# Patient Record
Sex: FEMALE | Race: OTHER
[De-identification: names, ages, dates, MRNs, and addresses within clinical notes are randomized per-mention and may not be internally consistent; named-entity substitution may affect disease eponyms.]

---

## 2017-12-04 ENCOUNTER — HOSPITAL ENCOUNTER (INPATIENT)
Dept: HOSPITAL 54 - ER | Age: 47
LOS: 3 days | Discharge: HOME | DRG: 917 | End: 2017-12-07
Attending: NURSE PRACTITIONER | Admitting: NURSE PRACTITIONER
Payer: COMMERCIAL

## 2017-12-04 VITALS — DIASTOLIC BLOOD PRESSURE: 79 MMHG | SYSTOLIC BLOOD PRESSURE: 153 MMHG

## 2017-12-04 VITALS — SYSTOLIC BLOOD PRESSURE: 113 MMHG | DIASTOLIC BLOOD PRESSURE: 76 MMHG

## 2017-12-04 VITALS — SYSTOLIC BLOOD PRESSURE: 135 MMHG | DIASTOLIC BLOOD PRESSURE: 77 MMHG

## 2017-12-04 VITALS — SYSTOLIC BLOOD PRESSURE: 105 MMHG | DIASTOLIC BLOOD PRESSURE: 74 MMHG

## 2017-12-04 VITALS — SYSTOLIC BLOOD PRESSURE: 136 MMHG | DIASTOLIC BLOOD PRESSURE: 79 MMHG

## 2017-12-04 VITALS — DIASTOLIC BLOOD PRESSURE: 87 MMHG | SYSTOLIC BLOOD PRESSURE: 149 MMHG

## 2017-12-04 VITALS — SYSTOLIC BLOOD PRESSURE: 122 MMHG | DIASTOLIC BLOOD PRESSURE: 77 MMHG

## 2017-12-04 VITALS — DIASTOLIC BLOOD PRESSURE: 77 MMHG | SYSTOLIC BLOOD PRESSURE: 135 MMHG

## 2017-12-04 VITALS — DIASTOLIC BLOOD PRESSURE: 87 MMHG | SYSTOLIC BLOOD PRESSURE: 143 MMHG

## 2017-12-04 VITALS — SYSTOLIC BLOOD PRESSURE: 169 MMHG | DIASTOLIC BLOOD PRESSURE: 78 MMHG

## 2017-12-04 VITALS — DIASTOLIC BLOOD PRESSURE: 88 MMHG | SYSTOLIC BLOOD PRESSURE: 171 MMHG

## 2017-12-04 VITALS — DIASTOLIC BLOOD PRESSURE: 70 MMHG | SYSTOLIC BLOOD PRESSURE: 138 MMHG

## 2017-12-04 VITALS — SYSTOLIC BLOOD PRESSURE: 130 MMHG | DIASTOLIC BLOOD PRESSURE: 89 MMHG

## 2017-12-04 VITALS — DIASTOLIC BLOOD PRESSURE: 89 MMHG | SYSTOLIC BLOOD PRESSURE: 172 MMHG

## 2017-12-04 VITALS — DIASTOLIC BLOOD PRESSURE: 86 MMHG | SYSTOLIC BLOOD PRESSURE: 171 MMHG

## 2017-12-04 VITALS — HEIGHT: 63 IN | BODY MASS INDEX: 26.4 KG/M2 | WEIGHT: 149 LBS

## 2017-12-04 VITALS — DIASTOLIC BLOOD PRESSURE: 79 MMHG | SYSTOLIC BLOOD PRESSURE: 170 MMHG

## 2017-12-04 VITALS — SYSTOLIC BLOOD PRESSURE: 180 MMHG | DIASTOLIC BLOOD PRESSURE: 74 MMHG

## 2017-12-04 VITALS — DIASTOLIC BLOOD PRESSURE: 57 MMHG | SYSTOLIC BLOOD PRESSURE: 143 MMHG

## 2017-12-04 VITALS — DIASTOLIC BLOOD PRESSURE: 76 MMHG | SYSTOLIC BLOOD PRESSURE: 107 MMHG

## 2017-12-04 VITALS — DIASTOLIC BLOOD PRESSURE: 91 MMHG | SYSTOLIC BLOOD PRESSURE: 142 MMHG

## 2017-12-04 VITALS — SYSTOLIC BLOOD PRESSURE: 164 MMHG | DIASTOLIC BLOOD PRESSURE: 83 MMHG

## 2017-12-04 VITALS — SYSTOLIC BLOOD PRESSURE: 162 MMHG | DIASTOLIC BLOOD PRESSURE: 83 MMHG

## 2017-12-04 VITALS — SYSTOLIC BLOOD PRESSURE: 167 MMHG | DIASTOLIC BLOOD PRESSURE: 87 MMHG

## 2017-12-04 VITALS — DIASTOLIC BLOOD PRESSURE: 86 MMHG | SYSTOLIC BLOOD PRESSURE: 140 MMHG

## 2017-12-04 VITALS — SYSTOLIC BLOOD PRESSURE: 199 MMHG | DIASTOLIC BLOOD PRESSURE: 100 MMHG

## 2017-12-04 DIAGNOSIS — E87.6: ICD-10-CM

## 2017-12-04 DIAGNOSIS — J96.02: ICD-10-CM
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
## 2017-12-04 DIAGNOSIS — D64.9: ICD-10-CM

## 2017-12-04 DIAGNOSIS — G92: ICD-10-CM

## 2017-12-04 DIAGNOSIS — T43.621A: Primary | ICD-10-CM

## 2017-12-04 DIAGNOSIS — G40.909: ICD-10-CM

## 2017-12-04 DIAGNOSIS — E87.1: ICD-10-CM

## 2017-12-04 DIAGNOSIS — J96.01: ICD-10-CM

## 2017-12-04 LAB
ALBUMIN SERPL BCP-MCNC: 3.3 G/DL (ref 3.4–5)
ALP SERPL-CCNC: 59 U/L (ref 46–116)
ALT SERPL W P-5'-P-CCNC: 15 U/L (ref 12–78)
APTT PPP: 22 SEC (ref 23–34)
AST SERPL W P-5'-P-CCNC: 16 U/L (ref 15–37)
BASE EXCESS BLDA CALC-SCNC: -3.1 MMOL/L
BASE EXCESS BLDA CALC-SCNC: -6.8 MMOL/L
BASOPHILS # BLD AUTO: 0.1 /CMM (ref 0–0.2)
BASOPHILS NFR BLD AUTO: 0.5 % (ref 0–2)
BILIRUB DIRECT SERPL-MCNC: 0 MG/DL (ref 0–0.2)
BILIRUB SERPL-MCNC: 0.1 MG/DL (ref 0.2–1)
BUN SERPL-MCNC: 15 MG/DL (ref 7–18)
CALCIUM SERPL-MCNC: 8.1 MG/DL (ref 8.5–10.1)
CHLORIDE SERPL-SCNC: 100 MMOL/L (ref 98–107)
CO2 SERPL-SCNC: 25 MMOL/L (ref 21–32)
CREAT SERPL-MCNC: 0.8 MG/DL (ref 0.6–1.3)
DO-HGB MFR BLDA: 0 MMHG
DO-HGB MFR BLDA: 69.3 MMHG
EOSINOPHIL # BLD AUTO: 0.2 /CMM (ref 0–0.7)
EOSINOPHIL NFR BLD AUTO: 2.6 % (ref 0–6)
GLUCOSE SERPL-MCNC: 122 MG/DL (ref 74–106)
HCT VFR BLD AUTO: 32 % (ref 33–45)
HGB BLD-MCNC: 10 G/DL (ref 11.5–14.8)
INHALED O2 CONCENTRATION: 40 %
INHALED O2 CONCENTRATION: 50 %
INHALED O2 FLOW RATE: 5 L/MIN (ref 0–30)
INR PPP: 1.02 (ref 0.87–1.13)
LYMPHOCYTES NFR BLD AUTO: 2.3 /CMM (ref 0.8–4.8)
LYMPHOCYTES NFR BLD AUTO: 24.3 % (ref 20–44)
MCH RBC QN AUTO: 26 PG (ref 26–33)
MCHC RBC AUTO-ENTMCNC: 31 G/DL (ref 31–36)
MCV RBC AUTO: 84 FL (ref 82–100)
MONOCYTES NFR BLD AUTO: 0.6 /CMM (ref 0.1–1.3)
MONOCYTES NFR BLD AUTO: 6.8 % (ref 2–12)
NEUTROPHILS # BLD AUTO: 6.2 /CMM (ref 1.8–8.9)
NEUTROPHILS NFR BLD AUTO: 65.8 % (ref 43–81)
PCO2 TEMP ADJ BLDA: 106.3 MMHG (ref 35–45)
PCO2 TEMP ADJ BLDA: 31.7 MMHG (ref 35–45)
PH TEMP ADJ BLDA: 7.01 [PH] (ref 7.35–7.45)
PH TEMP ADJ BLDA: 7.43 [PH] (ref 7.35–7.45)
PLATELET # BLD AUTO: 349 /CMM (ref 150–450)
PO2 TEMP ADJ BLDA: 246.8 MMHG (ref 75–100)
PO2 TEMP ADJ BLDA: 251.5 MMHG (ref 75–100)
POTASSIUM SERPL-SCNC: 3.7 MMOL/L (ref 3.5–5.1)
PROT SERPL-MCNC: 6.4 G/DL (ref 6.4–8.2)
PROTHROMBIN TIME: 10.6 SECS (ref 9.5–12.7)
RBC # BLD AUTO: 3.78 MIL/UL (ref 4–5.2)
RDW COEFFICIENT OF VARIATION: 14.4 (ref 11.5–15)
SAO2 % BLDA: 98.7 % (ref 92–98.5)
SAO2 % BLDA: 99.1 % (ref 92–98.5)
SODIUM SERPL-SCNC: 134 MMOL/L (ref 136–145)
TROPONIN I SERPL-MCNC: < 0.017 NG/ML (ref 0–0.06)
WBC NRBC COR # BLD AUTO: 9.4 K/UL (ref 4.3–11)

## 2017-12-04 PROCEDURE — 0BH17EZ INSERTION OF ENDOTRACHEAL AIRWAY INTO TRACHEA, VIA NATURAL OR ARTIFICIAL OPENING: ICD-10-PCS | Performed by: INTERNAL MEDICINE

## 2017-12-04 PROCEDURE — Z7610: HCPCS

## 2017-12-04 PROCEDURE — A4606 OXYGEN PROBE USED W OXIMETER: HCPCS

## 2017-12-04 PROCEDURE — 5A1935Z RESPIRATORY VENTILATION, LESS THAN 24 CONSECUTIVE HOURS: ICD-10-PCS | Performed by: INTERNAL MEDICINE

## 2017-12-04 RX ADMIN — PROPOFOL PRN MLS/HR: 10 INJECTION, EMULSION INTRAVENOUS at 20:36

## 2017-12-04 RX ADMIN — FAMOTIDINE SCH MG: 10 INJECTION INTRAVENOUS at 21:22

## 2017-12-04 RX ADMIN — PROPOFOL PRN MLS/HR: 10 INJECTION, EMULSION INTRAVENOUS at 22:12

## 2017-12-04 RX ADMIN — SODIUM CHLORIDE PRN MLS/HR: 9 INJECTION, SOLUTION INTRAVENOUS at 19:53

## 2017-12-04 NOTE — NUR
ICU/RN 

FAMILY AT BEDSIDE.PT HAS NO HX OF ANY DISEASE.HEALTHY ,GOING TO THE  GYM DAILY.SHE WAS FOUND 
IN [place] IN THE GYM HAD SEIZURES. CT OF THE HEAD NEGATIVE.PT IS FULL CODE.PT NEED TO BE 
INTUBATED.ER DOCTOR NOTIFIED.ABG ORDERED.CONTINUE MONITORING.

## 2017-12-04 NOTE — NUR
RN:ICU: PT RECEIVED IN BED, RECENTLY ADMITTED FROM ER FOR ACUTE ENCEPHALOPATHY SECONDARY TO 
NEW ONSET SZ. PT BARELY RESPONSIVE TO DEEP PAINFUL STIMULI. PT NOTED WITH SHALLOW BREATHING, 
GURGLING AND WITH HER TONGUE PARTIALLY OCCLUDING HER AIRWAY. SPOKE WITH NORBERTO NP REGARDING PT 
CONDITION AND CONCERNS WITH PATIENT BEING ABLE TO PROTECT AIRWAY. NP ORDERED TO HAVE ER MD 
TO INTUBATE PT AND PRESCRIBED PRN HYDRALAZINE FOR ELEVATED SBP. ER MD CALLED TO THE BEDSIDE 
TO ASSESS PT AND INTUBATE. BASED OF THE ER MD'S ASSESSMENT SHE FELT LIKE THE PATIENT WAS 
WAKING UP AND DID NOT NEED TO BE INTUBATED. STAT ABG ORDERED TO DETERMINE IF THE PATIENT WAS 
VENTILATING ADEQUATELY. PER ER MD PERFORM ABG AND CALL HER WITH THE RESULTS IF THERE WERE 
ANY ABNORMALITIES. RT AT BEDSIDE PERFORMING ABG. WILL CONTINUE TO MONITOR CLOSELY. PT FAMILY 
UPDATED REGARDING POC.

## 2017-12-04 NOTE — NUR
ICU/RN

PT ADMITTED FROM ER .UNRESPONSIVE .ON 6L SIMPLE MASK ,SAT O2-99%.PT HAS A LOT OF ORAL  
SECRETION. SUCTION PROVIDED.IV -HL.UNABLE PROVIDE ANY HISTORY.SKIN INTACT.

## 2017-12-04 NOTE — NUR
RN:ICU: ABG 1 HOUR POST INTUBATED REPORTED TO MD ON CALL. NEW ORDERS TO MAKE VENT CHANGES. 
ORDERS RECEIVED TO INCREASE DIPRIVAN TO GREATER THAN 50MCG/KG/MIN AS PT CONTINUES CONTINUES 
TO COUGH AND IS AT HIGH RISK FOR SELF EXTUBATION AS SHE COUGHS VIOLENTLY AND IS BITING TUBE. 
ALSO BSWR ORDERED FOR PT SAFETY AS SHE MAY SELF EXTUBATE, DESPITE REORIENTATION. FAMILY AT 
THE BEDSIDE AND AWARE OF POC. WILL CONTINUE TO MONITOR CLOSELY.

## 2017-12-04 NOTE — NUR
[id number], [id number] FROM [facility] FOR S/P SEIZURE WHILE IN [facility], BS-177, PT IS 
UNRESPONSIVE. RR IS EVEN AND UNLABORED WITH NAD NOTED. SKIN IS WARM AND NON 
DIAPHORETIC. NASAL TRUMPET NOTED PTA. DR MARTINEZ AT BS FOR EVAL. PATIENT 
PLACED ON THE MONITOR.

## 2017-12-04 NOTE — NUR
@1947 PT ORALLY INTUBATED BY DR. ARIAS. 7.5 ETT SECURED AT 22CM AT THE LIP. GOOD COLOR 
CHANGE ON CO2 DETECTOR. BREATH SOUND GOOD, EQUAL CHEST RISE. PT PLACED  VENT. PLACED 
ANCHOR FAST. ETT TUBE IS ON RIGHT SIDE OF THE LIP. SX'D FOR WHITE SECRETIONS. VENT ALARMS 
SET AND AUDIBLE. AMBU BAG AT BEDSIDE. VENT PLUGGED INTO RED OUTLET. WILL CONTINUE TO 
MONITOR.

## 2017-12-05 VITALS — DIASTOLIC BLOOD PRESSURE: 68 MMHG | SYSTOLIC BLOOD PRESSURE: 94 MMHG

## 2017-12-05 VITALS — SYSTOLIC BLOOD PRESSURE: 122 MMHG | DIASTOLIC BLOOD PRESSURE: 67 MMHG

## 2017-12-05 VITALS — SYSTOLIC BLOOD PRESSURE: 104 MMHG | DIASTOLIC BLOOD PRESSURE: 82 MMHG

## 2017-12-05 VITALS — SYSTOLIC BLOOD PRESSURE: 157 MMHG | DIASTOLIC BLOOD PRESSURE: 84 MMHG

## 2017-12-05 VITALS — DIASTOLIC BLOOD PRESSURE: 88 MMHG | SYSTOLIC BLOOD PRESSURE: 151 MMHG

## 2017-12-05 VITALS — SYSTOLIC BLOOD PRESSURE: 146 MMHG | DIASTOLIC BLOOD PRESSURE: 86 MMHG

## 2017-12-05 VITALS — SYSTOLIC BLOOD PRESSURE: 150 MMHG | DIASTOLIC BLOOD PRESSURE: 87 MMHG

## 2017-12-05 VITALS — SYSTOLIC BLOOD PRESSURE: 147 MMHG | DIASTOLIC BLOOD PRESSURE: 80 MMHG

## 2017-12-05 VITALS — DIASTOLIC BLOOD PRESSURE: 73 MMHG | SYSTOLIC BLOOD PRESSURE: 129 MMHG

## 2017-12-05 VITALS — SYSTOLIC BLOOD PRESSURE: 107 MMHG | DIASTOLIC BLOOD PRESSURE: 64 MMHG

## 2017-12-05 VITALS — DIASTOLIC BLOOD PRESSURE: 64 MMHG | SYSTOLIC BLOOD PRESSURE: 120 MMHG

## 2017-12-05 VITALS — DIASTOLIC BLOOD PRESSURE: 73 MMHG | SYSTOLIC BLOOD PRESSURE: 122 MMHG

## 2017-12-05 VITALS — DIASTOLIC BLOOD PRESSURE: 66 MMHG | SYSTOLIC BLOOD PRESSURE: 112 MMHG

## 2017-12-05 VITALS — SYSTOLIC BLOOD PRESSURE: 111 MMHG | DIASTOLIC BLOOD PRESSURE: 68 MMHG

## 2017-12-05 VITALS — DIASTOLIC BLOOD PRESSURE: 95 MMHG | SYSTOLIC BLOOD PRESSURE: 172 MMHG

## 2017-12-05 VITALS — SYSTOLIC BLOOD PRESSURE: 150 MMHG | DIASTOLIC BLOOD PRESSURE: 91 MMHG

## 2017-12-05 VITALS — DIASTOLIC BLOOD PRESSURE: 69 MMHG | SYSTOLIC BLOOD PRESSURE: 122 MMHG

## 2017-12-05 VITALS — SYSTOLIC BLOOD PRESSURE: 125 MMHG | DIASTOLIC BLOOD PRESSURE: 61 MMHG

## 2017-12-05 VITALS — SYSTOLIC BLOOD PRESSURE: 157 MMHG | DIASTOLIC BLOOD PRESSURE: 87 MMHG

## 2017-12-05 VITALS — SYSTOLIC BLOOD PRESSURE: 157 MMHG | DIASTOLIC BLOOD PRESSURE: 78 MMHG

## 2017-12-05 VITALS — SYSTOLIC BLOOD PRESSURE: 110 MMHG | DIASTOLIC BLOOD PRESSURE: 63 MMHG

## 2017-12-05 VITALS — DIASTOLIC BLOOD PRESSURE: 67 MMHG | SYSTOLIC BLOOD PRESSURE: 125 MMHG

## 2017-12-05 VITALS — SYSTOLIC BLOOD PRESSURE: 153 MMHG | DIASTOLIC BLOOD PRESSURE: 58 MMHG

## 2017-12-05 VITALS — DIASTOLIC BLOOD PRESSURE: 67 MMHG | SYSTOLIC BLOOD PRESSURE: 111 MMHG

## 2017-12-05 VITALS — DIASTOLIC BLOOD PRESSURE: 69 MMHG | SYSTOLIC BLOOD PRESSURE: 118 MMHG

## 2017-12-05 VITALS — SYSTOLIC BLOOD PRESSURE: 151 MMHG | DIASTOLIC BLOOD PRESSURE: 90 MMHG

## 2017-12-05 VITALS — SYSTOLIC BLOOD PRESSURE: 115 MMHG | DIASTOLIC BLOOD PRESSURE: 69 MMHG

## 2017-12-05 VITALS — SYSTOLIC BLOOD PRESSURE: 140 MMHG | DIASTOLIC BLOOD PRESSURE: 84 MMHG

## 2017-12-05 VITALS — DIASTOLIC BLOOD PRESSURE: 89 MMHG | SYSTOLIC BLOOD PRESSURE: 121 MMHG

## 2017-12-05 VITALS — DIASTOLIC BLOOD PRESSURE: 45 MMHG | SYSTOLIC BLOOD PRESSURE: 116 MMHG

## 2017-12-05 VITALS — DIASTOLIC BLOOD PRESSURE: 55 MMHG | SYSTOLIC BLOOD PRESSURE: 113 MMHG

## 2017-12-05 VITALS — SYSTOLIC BLOOD PRESSURE: 116 MMHG | DIASTOLIC BLOOD PRESSURE: 45 MMHG

## 2017-12-05 VITALS — SYSTOLIC BLOOD PRESSURE: 160 MMHG | DIASTOLIC BLOOD PRESSURE: 58 MMHG

## 2017-12-05 VITALS — SYSTOLIC BLOOD PRESSURE: 153 MMHG | DIASTOLIC BLOOD PRESSURE: 87 MMHG

## 2017-12-05 VITALS — SYSTOLIC BLOOD PRESSURE: 111 MMHG | DIASTOLIC BLOOD PRESSURE: 61 MMHG

## 2017-12-05 VITALS — DIASTOLIC BLOOD PRESSURE: 72 MMHG | SYSTOLIC BLOOD PRESSURE: 144 MMHG

## 2017-12-05 VITALS — DIASTOLIC BLOOD PRESSURE: 64 MMHG | SYSTOLIC BLOOD PRESSURE: 116 MMHG

## 2017-12-05 VITALS — SYSTOLIC BLOOD PRESSURE: 119 MMHG | DIASTOLIC BLOOD PRESSURE: 71 MMHG

## 2017-12-05 VITALS — SYSTOLIC BLOOD PRESSURE: 154 MMHG | DIASTOLIC BLOOD PRESSURE: 89 MMHG

## 2017-12-05 VITALS — SYSTOLIC BLOOD PRESSURE: 155 MMHG | DIASTOLIC BLOOD PRESSURE: 89 MMHG

## 2017-12-05 VITALS — SYSTOLIC BLOOD PRESSURE: 145 MMHG | DIASTOLIC BLOOD PRESSURE: 85 MMHG

## 2017-12-05 VITALS — SYSTOLIC BLOOD PRESSURE: 120 MMHG | DIASTOLIC BLOOD PRESSURE: 65 MMHG

## 2017-12-05 VITALS — SYSTOLIC BLOOD PRESSURE: 167 MMHG | DIASTOLIC BLOOD PRESSURE: 88 MMHG

## 2017-12-05 LAB
APPEARANCE UR: CLEAR
BASE EXCESS BLDA CALC-SCNC: -2.7 MMOL/L
BASE EXCESS BLDA CALC-SCNC: -3 MMOL/L
BASOPHILS # BLD AUTO: 0 /CMM (ref 0–0.2)
BASOPHILS NFR BLD AUTO: 0.2 % (ref 0–2)
BILIRUB UR QL STRIP: NEGATIVE
BUN SERPL-MCNC: 8 MG/DL (ref 7–18)
CALCIUM SERPL-MCNC: 8 MG/DL (ref 8.5–10.1)
CHLORIDE SERPL-SCNC: 106 MMOL/L (ref 98–107)
CO2 SERPL-SCNC: 23 MMOL/L (ref 21–32)
COLOR UR: YELLOW
CREAT SERPL-MCNC: 0.5 MG/DL (ref 0.6–1.3)
DO-HGB MFR BLDA: 29.1 MMHG
DO-HGB MFR BLDA: 64.9 MMHG
EOSINOPHIL # BLD AUTO: 0 /CMM (ref 0–0.7)
EOSINOPHIL NFR BLD AUTO: 0.3 % (ref 0–6)
GLUCOSE SERPL-MCNC: 85 MG/DL (ref 74–106)
GLUCOSE UR STRIP-MCNC: NEGATIVE MG/DL
HCT VFR BLD AUTO: 31 % (ref 33–45)
HGB BLD-MCNC: 10 G/DL (ref 11.5–14.8)
HGB UR QL STRIP: (no result) ERY/UL
INHALED O2 CONCENTRATION: 36 %
INHALED O2 CONCENTRATION: 40 %
INHALED O2 FLOW RATE: 4 L/MIN (ref 0–30)
INTRINSIC PEEP RESPIRATORY: 5 CM H2O
KETONES UR STRIP-MCNC: (no result) MG/DL
LEUKOCYTE ESTERASE UR QL STRIP: NEGATIVE
LYMPHOCYTES NFR BLD AUTO: 1.3 /CMM (ref 0.8–4.8)
LYMPHOCYTES NFR BLD AUTO: 13.2 % (ref 20–44)
MAGNESIUM SERPL-MCNC: 1.9 MG/DL (ref 1.8–2.4)
MCH RBC QN AUTO: 27 PG (ref 26–33)
MCHC RBC AUTO-ENTMCNC: 32 G/DL (ref 31–36)
MCV RBC AUTO: 83 FL (ref 82–100)
MONOCYTES NFR BLD AUTO: 0.9 /CMM (ref 0.1–1.3)
MONOCYTES NFR BLD AUTO: 8.7 % (ref 2–12)
NEUTROPHILS # BLD AUTO: 7.8 /CMM (ref 1.8–8.9)
NEUTROPHILS NFR BLD AUTO: 77.6 % (ref 43–81)
NITRITE UR QL STRIP: NEGATIVE
PCO2 TEMP ADJ BLDA: 40.9 MMHG (ref 35–45)
PCO2 TEMP ADJ BLDA: 43.6 MMHG (ref 35–45)
PH TEMP ADJ BLDA: 7.34 [PH] (ref 7.35–7.45)
PH TEMP ADJ BLDA: 7.35 [PH] (ref 7.35–7.45)
PH UR STRIP: 6.5 [PH] (ref 5–8)
PHOSPHATE SERPL-MCNC: 3.6 MG/DL (ref 2.5–4.9)
PLATELET # BLD AUTO: 305 /CMM (ref 150–450)
PO2 TEMP ADJ BLDA: 173.3 MMHG (ref 75–100)
PO2 TEMP ADJ BLDA: 177 MMHG (ref 75–100)
POTASSIUM SERPL-SCNC: 3.3 MMOL/L (ref 3.5–5.1)
PROT UR QL STRIP: (no result) MG/DL
RBC # BLD AUTO: 3.77 MIL/UL (ref 4–5.2)
RBC #/AREA URNS HPF: (no result) /HPF (ref 0–2)
RDW COEFFICIENT OF VARIATION: 14.4 (ref 11.5–15)
SAO2 % BLDA: 98.4 % (ref 92–98.5)
SAO2 % BLDA: 98.6 % (ref 92–98.5)
SET RATE, BG: 4
SODIUM SERPL-SCNC: 139 MMOL/L (ref 136–145)
TSH SERPL DL<=0.005 MIU/L-ACNC: 0.54 UIU/ML (ref 0.36–3.74)
UROBILINOGEN UR STRIP-MCNC: 0.2 EU/DL
VENTILATION MODE VENT: (no result)
VENTILATION MODE VENT: (no result)
WBC #/AREA URNS HPF: (no result) /HPF (ref 0–3)
WBC NRBC COR # BLD AUTO: 10.1 K/UL (ref 4.3–11)

## 2017-12-05 RX ADMIN — PROPOFOL PRN MLS/HR: 10 INJECTION, EMULSION INTRAVENOUS at 08:23

## 2017-12-05 RX ADMIN — ACETAMINOPHEN PRN MG: 325 TABLET ORAL at 20:27

## 2017-12-05 RX ADMIN — ENOXAPARIN SODIUM SCH MG: 40 INJECTION SUBCUTANEOUS at 08:42

## 2017-12-05 RX ADMIN — POTASSIUM CHLORIDE SCH MLS/HR: 200 INJECTION, SOLUTION INTRAVENOUS at 10:31

## 2017-12-05 RX ADMIN — SODIUM CHLORIDE SCH MLS/HR: 9 INJECTION, SOLUTION INTRAVENOUS at 20:27

## 2017-12-05 RX ADMIN — PROPOFOL PRN MLS/HR: 10 INJECTION, EMULSION INTRAVENOUS at 00:55

## 2017-12-05 RX ADMIN — PROPOFOL PRN MLS/HR: 10 INJECTION, EMULSION INTRAVENOUS at 03:56

## 2017-12-05 RX ADMIN — ACETAMINOPHEN, ASPIRIN (NSAID), AND CAFFEINE PRN EACH: 250; 250; 65 TABLET, FILM COATED ORAL at 17:26

## 2017-12-05 RX ADMIN — FAMOTIDINE SCH MG: 10 INJECTION INTRAVENOUS at 08:41

## 2017-12-05 RX ADMIN — SODIUM CHLORIDE PRN MLS/HR: 9 INJECTION, SOLUTION INTRAVENOUS at 17:17

## 2017-12-05 RX ADMIN — SODIUM CHLORIDE PRN MLS/HR: 9 INJECTION, SOLUTION INTRAVENOUS at 03:57

## 2017-12-05 RX ADMIN — FAMOTIDINE SCH MG: 10 INJECTION INTRAVENOUS at 20:27

## 2017-12-05 RX ADMIN — POTASSIUM CHLORIDE SCH MLS/HR: 200 INJECTION, SOLUTION INTRAVENOUS at 11:31

## 2017-12-05 RX ADMIN — ACETAMINOPHEN PRN MG: 325 TABLET ORAL at 12:40

## 2017-12-05 RX ADMIN — SODIUM CHLORIDE SCH MLS/HR: 9 INJECTION, SOLUTION INTRAVENOUS at 08:45

## 2017-12-05 RX ADMIN — PROPOFOL PRN MLS/HR: 10 INJECTION, EMULSION INTRAVENOUS at 05:42

## 2017-12-05 NOTE — NUR
Patient lives at home with family. Prior to admission, she was physically active, works at 
[employer]. Patient was extubated, tolerating O2 nasal . Plan to dc back home, has good 
family support. 










-------------------------------------------------------------------------------

Addendum: 12/05/17 at 1629 by GAYLE FRANCO RN

-------------------------------------------------------------------------------

Amended: Links added.

## 2017-12-05 NOTE — NUR
PT C/O OF MIGRAINE HEADACHE, GAVE TYLENOL EARLIER WITH NO RELIEF. NOTIFIED DR. TRIANA WHO 
OKAYS TO ORDER EXCEDRIN 1 TAB Q6HR PRN MIGRAINE.

## 2017-12-05 NOTE — NUR
DR. TRIANA NOTIFIED ABOUT POTASSIUM REPLACEMENT HE SAYS ITS OKAY TO REPLACE THROUGH NGTUBE 
INSTEAD OF IV BECAUSE WE ONLY HAVE PERIPH IV'S.

## 2017-12-05 NOTE — NUR
RN:ICU: PT CONTINUES TO BECOME EASILY AGITATED, COUGHING VIOLENTLY AND IS AT RISK FOR SELF 
EXTUBATION, DESPITE BEING ON 90MCG/KG/MIN. SEDATION INCREASED FOR PT COMFORT AND SAFETY, 
WITH BSWR IN PLACE. PT HAS HIGH TOLERANCE TO SEDATION MEDICATION. IV SITE WHERE DIPRIVAN IS 
INFUSING REMAINS PATENT. PT URINE TOXICOLOGY SCREEN POSITIVE FOR AMPHETAMINES, WILL ENDORSE 
TO ONCOMING SHIFT.

## 2017-12-05 NOTE — NUR
TITRATING DOWN DIPRIVAN (FROM 100MCG) FOR SEDATION VACATION. PT IS ALREADY RESTLESS MOVING 
ARM AND LEGS, BUT DOESN'T FOLLOW SIMPLE COMMANDS YET TO OPEN EYES OR SQUEEZE HANDS. WILL 
CONTINUE TO TITRATE DOWN AND ASSESS. RESTRAINTS IN PLACE. PT'S BROTHER IS AT BEDSIDE, I 
ORIENTED HIM TO THE PLAN OF CARE AND TO REMAIN CALM AND TO ORIENT HER ABOUT THE SITUATION AS 
SHE CONTINUES TO WAKE UP.

## 2017-12-05 NOTE — NUR
RN:ICU: PT RECEIVED IN BED CONFUSED BUT ABLE TO FOLLOW COMMANDS. PT CONTINUES TO ASK HOW SHE 
GOT TO THE HOSPITAL. PT REORIENTED SEVERAL TIMES BUT PT REMAINS CONFUSED. PT DENIES PAIN OR 
RESPIRATORY DISTRESS. ASPIRATION AND FALL PRECAUTIONS IN PLACE. WILL CONTINUE TO MONITOR 
CLOSELY.

## 2017-12-06 VITALS — SYSTOLIC BLOOD PRESSURE: 125 MMHG | DIASTOLIC BLOOD PRESSURE: 63 MMHG

## 2017-12-06 VITALS — DIASTOLIC BLOOD PRESSURE: 71 MMHG | SYSTOLIC BLOOD PRESSURE: 141 MMHG

## 2017-12-06 VITALS — DIASTOLIC BLOOD PRESSURE: 73 MMHG | SYSTOLIC BLOOD PRESSURE: 136 MMHG

## 2017-12-06 VITALS — SYSTOLIC BLOOD PRESSURE: 143 MMHG | DIASTOLIC BLOOD PRESSURE: 81 MMHG

## 2017-12-06 VITALS — DIASTOLIC BLOOD PRESSURE: 77 MMHG | SYSTOLIC BLOOD PRESSURE: 126 MMHG

## 2017-12-06 VITALS — DIASTOLIC BLOOD PRESSURE: 78 MMHG | SYSTOLIC BLOOD PRESSURE: 144 MMHG

## 2017-12-06 VITALS — SYSTOLIC BLOOD PRESSURE: 118 MMHG | DIASTOLIC BLOOD PRESSURE: 69 MMHG

## 2017-12-06 VITALS — DIASTOLIC BLOOD PRESSURE: 64 MMHG | SYSTOLIC BLOOD PRESSURE: 116 MMHG

## 2017-12-06 VITALS — DIASTOLIC BLOOD PRESSURE: 76 MMHG | SYSTOLIC BLOOD PRESSURE: 130 MMHG

## 2017-12-06 VITALS — DIASTOLIC BLOOD PRESSURE: 90 MMHG | SYSTOLIC BLOOD PRESSURE: 159 MMHG

## 2017-12-06 VITALS — SYSTOLIC BLOOD PRESSURE: 150 MMHG | DIASTOLIC BLOOD PRESSURE: 82 MMHG

## 2017-12-06 VITALS — DIASTOLIC BLOOD PRESSURE: 83 MMHG | SYSTOLIC BLOOD PRESSURE: 154 MMHG

## 2017-12-06 VITALS — SYSTOLIC BLOOD PRESSURE: 152 MMHG | DIASTOLIC BLOOD PRESSURE: 92 MMHG

## 2017-12-06 VITALS — DIASTOLIC BLOOD PRESSURE: 74 MMHG | SYSTOLIC BLOOD PRESSURE: 137 MMHG

## 2017-12-06 VITALS — DIASTOLIC BLOOD PRESSURE: 67 MMHG | SYSTOLIC BLOOD PRESSURE: 117 MMHG

## 2017-12-06 VITALS — SYSTOLIC BLOOD PRESSURE: 150 MMHG | DIASTOLIC BLOOD PRESSURE: 81 MMHG

## 2017-12-06 VITALS — SYSTOLIC BLOOD PRESSURE: 121 MMHG | DIASTOLIC BLOOD PRESSURE: 63 MMHG

## 2017-12-06 VITALS — DIASTOLIC BLOOD PRESSURE: 66 MMHG | SYSTOLIC BLOOD PRESSURE: 125 MMHG

## 2017-12-06 VITALS — SYSTOLIC BLOOD PRESSURE: 156 MMHG | DIASTOLIC BLOOD PRESSURE: 91 MMHG

## 2017-12-06 VITALS — SYSTOLIC BLOOD PRESSURE: 131 MMHG | DIASTOLIC BLOOD PRESSURE: 82 MMHG

## 2017-12-06 VITALS — SYSTOLIC BLOOD PRESSURE: 147 MMHG | DIASTOLIC BLOOD PRESSURE: 75 MMHG

## 2017-12-06 VITALS — SYSTOLIC BLOOD PRESSURE: 143 MMHG | DIASTOLIC BLOOD PRESSURE: 94 MMHG

## 2017-12-06 LAB
BASOPHILS # BLD AUTO: 0 /CMM (ref 0–0.2)
BASOPHILS NFR BLD AUTO: 0.4 % (ref 0–2)
BUN SERPL-MCNC: 4 MG/DL (ref 7–18)
CALCIUM SERPL-MCNC: 8.4 MG/DL (ref 8.5–10.1)
CHLORIDE SERPL-SCNC: 105 MMOL/L (ref 98–107)
CO2 SERPL-SCNC: 26 MMOL/L (ref 21–32)
CREAT SERPL-MCNC: 0.5 MG/DL (ref 0.6–1.3)
EOSINOPHIL # BLD AUTO: 0 /CMM (ref 0–0.7)
EOSINOPHIL NFR BLD AUTO: 0.5 % (ref 0–6)
GLUCOSE SERPL-MCNC: 87 MG/DL (ref 74–106)
HCT VFR BLD AUTO: 30 % (ref 33–45)
HGB BLD-MCNC: 9.8 G/DL (ref 11.5–14.8)
LYMPHOCYTES NFR BLD AUTO: 1.2 /CMM (ref 0.8–4.8)
LYMPHOCYTES NFR BLD AUTO: 14 % (ref 20–44)
MCH RBC QN AUTO: 27 PG (ref 26–33)
MCHC RBC AUTO-ENTMCNC: 33 G/DL (ref 31–36)
MCV RBC AUTO: 83 FL (ref 82–100)
MONOCYTES NFR BLD AUTO: 0.7 /CMM (ref 0.1–1.3)
MONOCYTES NFR BLD AUTO: 7.6 % (ref 2–12)
NEUTROPHILS # BLD AUTO: 6.8 /CMM (ref 1.8–8.9)
NEUTROPHILS NFR BLD AUTO: 77.5 % (ref 43–81)
PLATELET # BLD AUTO: 289 /CMM (ref 150–450)
POTASSIUM SERPL-SCNC: 3.1 MMOL/L (ref 3.5–5.1)
RBC # BLD AUTO: 3.61 MIL/UL (ref 4–5.2)
RDW COEFFICIENT OF VARIATION: 14.6 (ref 11.5–15)
SODIUM SERPL-SCNC: 139 MMOL/L (ref 136–145)
WBC NRBC COR # BLD AUTO: 8.8 K/UL (ref 4.3–11)

## 2017-12-06 RX ADMIN — SODIUM CHLORIDE SCH MLS/HR: 9 INJECTION, SOLUTION INTRAVENOUS at 08:39

## 2017-12-06 RX ADMIN — SODIUM CHLORIDE PRN MLS/HR: 9 INJECTION, SOLUTION INTRAVENOUS at 19:56

## 2017-12-06 RX ADMIN — FAMOTIDINE SCH MG: 10 INJECTION INTRAVENOUS at 08:33

## 2017-12-06 RX ADMIN — SODIUM CHLORIDE PRN MLS/HR: 9 INJECTION, SOLUTION INTRAVENOUS at 09:52

## 2017-12-06 RX ADMIN — FAMOTIDINE SCH MG: 10 INJECTION INTRAVENOUS at 21:57

## 2017-12-06 RX ADMIN — SODIUM CHLORIDE PRN MLS/HR: 9 INJECTION, SOLUTION INTRAVENOUS at 01:06

## 2017-12-06 RX ADMIN — LEVETIRACETAM SCH MG: 250 TABLET, FILM COATED ORAL at 21:57

## 2017-12-06 RX ADMIN — ENOXAPARIN SODIUM SCH MG: 40 INJECTION SUBCUTANEOUS at 08:38

## 2017-12-06 RX ADMIN — ACETAMINOPHEN, ASPIRIN (NSAID), AND CAFFEINE PRN EACH: 250; 250; 65 TABLET, FILM COATED ORAL at 08:33

## 2017-12-06 NOTE — NUR
ICU RN INITIAL NOTE



PT RECEIVED IN BED RESTING COMFORTABLY. A/O X3 AND ABLE TO VERBALIZE NEEDS. ON ROOM AIR AND 
SATURATING WELL. BREATHING EVEN, REGULAR AND UNLABORED. TELE- SR. IV RFA/LFA CLEAN AND DRY 
WITH FLUIDS INFUSING. NO C/O PAIN AT THIS TIME. NO SOB NOTED. HOB ELEVATED. FAMILY AT 
BEDSIDE. CALL LIGHT WITHIN REACH. WILL CONTINUE TO MONITOR.

## 2017-12-06 NOTE — NUR
ICU NOTE 



PT MORE ALERT TODAY. LONG TERM MEMORY INTACT, BUT SHE DOESN'T RECALL THE EVENTS THAT BROUGHT 
HER TO THE HOSPITAL. SHE REMEMBERS BITS AND PARTS FROM YESTERDAY AFTER EXTUBATION HOWEVER 
SHE SAYS ITS FOGGY, SHE FEELS LIKE SHE WOKE UP FROM A DEEP SLEEP AND SOME OF THE HOSPITAL 
STAFF SUCH AS MYSELF LOOK FAMILIAR. I ORIENTED HER TO HER CURRENT SITUATION.

## 2017-12-07 VITALS — DIASTOLIC BLOOD PRESSURE: 92 MMHG | SYSTOLIC BLOOD PRESSURE: 158 MMHG

## 2017-12-07 VITALS — SYSTOLIC BLOOD PRESSURE: 136 MMHG | DIASTOLIC BLOOD PRESSURE: 88 MMHG

## 2017-12-07 VITALS — DIASTOLIC BLOOD PRESSURE: 67 MMHG | SYSTOLIC BLOOD PRESSURE: 122 MMHG

## 2017-12-07 VITALS — SYSTOLIC BLOOD PRESSURE: 151 MMHG | DIASTOLIC BLOOD PRESSURE: 87 MMHG

## 2017-12-07 VITALS — SYSTOLIC BLOOD PRESSURE: 130 MMHG | DIASTOLIC BLOOD PRESSURE: 85 MMHG

## 2017-12-07 VITALS — DIASTOLIC BLOOD PRESSURE: 79 MMHG | SYSTOLIC BLOOD PRESSURE: 154 MMHG

## 2017-12-07 VITALS — SYSTOLIC BLOOD PRESSURE: 123 MMHG | DIASTOLIC BLOOD PRESSURE: 78 MMHG

## 2017-12-07 VITALS — SYSTOLIC BLOOD PRESSURE: 126 MMHG | DIASTOLIC BLOOD PRESSURE: 80 MMHG

## 2017-12-07 VITALS — SYSTOLIC BLOOD PRESSURE: 141 MMHG | DIASTOLIC BLOOD PRESSURE: 81 MMHG

## 2017-12-07 VITALS — DIASTOLIC BLOOD PRESSURE: 91 MMHG | SYSTOLIC BLOOD PRESSURE: 146 MMHG

## 2017-12-07 VITALS — SYSTOLIC BLOOD PRESSURE: 144 MMHG | DIASTOLIC BLOOD PRESSURE: 88 MMHG

## 2017-12-07 VITALS — DIASTOLIC BLOOD PRESSURE: 89 MMHG | SYSTOLIC BLOOD PRESSURE: 141 MMHG

## 2017-12-07 VITALS — SYSTOLIC BLOOD PRESSURE: 130 MMHG | DIASTOLIC BLOOD PRESSURE: 78 MMHG

## 2017-12-07 LAB
BASOPHILS # BLD AUTO: 0 /CMM (ref 0–0.2)
BASOPHILS NFR BLD AUTO: 0.5 % (ref 0–2)
BUN SERPL-MCNC: 3 MG/DL (ref 7–18)
CALCIUM SERPL-MCNC: 8 MG/DL (ref 8.5–10.1)
CHLORIDE SERPL-SCNC: 110 MMOL/L (ref 98–107)
CO2 SERPL-SCNC: 27 MMOL/L (ref 21–32)
CREAT SERPL-MCNC: 0.4 MG/DL (ref 0.6–1.3)
EOSINOPHIL # BLD AUTO: 0.1 /CMM (ref 0–0.7)
EOSINOPHIL NFR BLD AUTO: 1.9 % (ref 0–6)
GLUCOSE SERPL-MCNC: 88 MG/DL (ref 74–106)
HCT VFR BLD AUTO: 29 % (ref 33–45)
HGB BLD-MCNC: 9.5 G/DL (ref 11.5–14.8)
LYMPHOCYTES NFR BLD AUTO: 1.5 /CMM (ref 0.8–4.8)
LYMPHOCYTES NFR BLD AUTO: 20.4 % (ref 20–44)
MCH RBC QN AUTO: 27 PG (ref 26–33)
MCHC RBC AUTO-ENTMCNC: 33 G/DL (ref 31–36)
MCV RBC AUTO: 84 FL (ref 82–100)
MONOCYTES NFR BLD AUTO: 0.7 /CMM (ref 0.1–1.3)
MONOCYTES NFR BLD AUTO: 10 % (ref 2–12)
NEUTROPHILS # BLD AUTO: 4.8 /CMM (ref 1.8–8.9)
NEUTROPHILS NFR BLD AUTO: 67.2 % (ref 43–81)
PLATELET # BLD AUTO: 266 /CMM (ref 150–450)
POTASSIUM SERPL-SCNC: 3.2 MMOL/L (ref 3.5–5.1)
RBC # BLD AUTO: 3.49 MIL/UL (ref 4–5.2)
RDW COEFFICIENT OF VARIATION: 14.5 (ref 11.5–15)
SODIUM SERPL-SCNC: 143 MMOL/L (ref 136–145)
WBC NRBC COR # BLD AUTO: 7.1 K/UL (ref 4.3–11)

## 2017-12-07 RX ADMIN — FAMOTIDINE SCH MG: 10 INJECTION INTRAVENOUS at 09:39

## 2017-12-07 RX ADMIN — ENOXAPARIN SODIUM SCH MG: 40 INJECTION SUBCUTANEOUS at 09:43

## 2017-12-07 RX ADMIN — SODIUM CHLORIDE PRN MLS/HR: 9 INJECTION, SOLUTION INTRAVENOUS at 06:05

## 2017-12-07 RX ADMIN — LEVETIRACETAM SCH MG: 250 TABLET, FILM COATED ORAL at 09:39

## 2017-12-07 RX ADMIN — SODIUM CHLORIDE PRN MLS/HR: 9 INJECTION, SOLUTION INTRAVENOUS at 10:52

## 2017-12-07 NOTE — NUR
MS RN NOTE 

 RECEIVED PATIENT FROM ICU ,ALERT , ORIENTED X3 ON  MED SURGE UNIT, HOSPITAL ORIENTATION 
DONE , VS TAKEN , PLAN OF CARE DISCUSSED  WITH PATIENT ,BED IN LOWEST ND LOCKED POSITION , 
NOT IN DISTRESS ,WILL CONT TO  MONITOR CLOSELY, CALL LIGHT WITHIN REACH

## 2017-12-07 NOTE — NUR
MS RN NOTES



PT DISCHARGE TO HOME , WENT TO THE LOBBY WITH SON , EX  VIA WHEELCHAIR ACCOMPANIED BY 
THE CNA. PT IS STABLE WERE STABLE.

## 2017-12-07 NOTE — NUR
YVONNE received a call from Dr. Rollins requesting for SW to give pt. a verification of admission 
letter. YVONNE completed verification of admission letter stating pt. was at [facility] from 12/4 till 
12/7 for an acute condition and gave it to pt. bedside.

## 2017-12-07 NOTE — NUR
ICU RN- PT TRANSFERRED TO MS ROOM 117-2 VIA WHEELCHAIR. BEDSIDE REPORT GIVEN BY CHANTEL HOWELL TO 
SELMA HOWELL. ALL BELONGINGS SENT WITH PATIENT.

## 2017-12-07 NOTE — NUR
ICU RN- INITIAL NOTE

RECEIVED PT A/O X3. ON ROOM AIR, RESPIRATIONS EVEN AND UNLABORED, NO SOB OR DISTRESS 
PRESENT. BEDSIDE MONITOR REVEALS SINUS RHYTHM. RFA 20G RUNNING NS @ 100 ML/HR AND LFA 18G HL 
FLUSHED, PATENT AND INTACT. BOTH IV SITES FREE OF REDNESS, SWELLING AND INFLAMMATION. PT 
CONTINENT OF URINE & STOOL, ASSISTED PT TO BEDSIDE COMMODE. SAFETY MEASURES TAKEN: LOCKED 
AND IN LOW POSITION, SIDE RAILS UP X2, BED ALARM ON AND CALL LIGHT WITHIN REACH, WILL 
CONTINUE TO MONITOR.

## 2017-12-07 NOTE — NUR
MS RN NOTES

PER DOCTOR KONG , PT IS OKAY TO DISCHARGE HOME. DC INSTRUCTIONS GIVEN UNDERSTOOD. HEPLOCK 
REMOVE BOTH ARMS. NO SIGNS OF INFECTION, PAIN , REDNESS NOTED. PRESCRIPTION GIVEN , 
EXPLAINED HOW TO TAKE MEDICATIONS AND POSSIBLE SIDE EFFECTS. INSTRUCTED PATIENT AND FAMILY 
TO FOLLOW UP WITH THE PRIMARY CARE DOCTOR NEXT WEEK AND FOR ANY CONCERNS. BELONGINGS SIGNED, 
SPOKE WITH  WITH EXCUSE LETTER FROM WORK , DR TRIANA NOTIFIED.

## 2019-04-11 ENCOUNTER — HOSPITAL ENCOUNTER (INPATIENT)
Dept: HOSPITAL 54 - ER | Age: 49
LOS: 2 days | Discharge: HOME | DRG: 100 | End: 2019-04-13
Attending: INTERNAL MEDICINE | Admitting: NURSE PRACTITIONER
Payer: COMMERCIAL

## 2019-04-11 VITALS — DIASTOLIC BLOOD PRESSURE: 84 MMHG | SYSTOLIC BLOOD PRESSURE: 138 MMHG

## 2019-04-11 VITALS — SYSTOLIC BLOOD PRESSURE: 139 MMHG | DIASTOLIC BLOOD PRESSURE: 77 MMHG

## 2019-04-11 VITALS — WEIGHT: 147 LBS | HEIGHT: 65 IN | BODY MASS INDEX: 24.49 KG/M2

## 2019-04-11 DIAGNOSIS — D63.8: ICD-10-CM

## 2019-04-11 DIAGNOSIS — F15.90: ICD-10-CM

## 2019-04-11 DIAGNOSIS — E44.1: ICD-10-CM

## 2019-04-11 DIAGNOSIS — F32.9: ICD-10-CM

## 2019-04-11 DIAGNOSIS — Z91.14: ICD-10-CM

## 2019-04-11 DIAGNOSIS — E87.6: ICD-10-CM

## 2019-04-11 DIAGNOSIS — I51.7: ICD-10-CM

## 2019-04-11 DIAGNOSIS — G92: ICD-10-CM

## 2019-04-11 DIAGNOSIS — G40.909: Primary | ICD-10-CM

## 2019-04-11 LAB
ALBUMIN SERPL BCP-MCNC: 3.3 G/DL (ref 3.4–5)
ALP SERPL-CCNC: 60 U/L (ref 46–116)
ALT SERPL W P-5'-P-CCNC: 15 U/L (ref 12–78)
AST SERPL W P-5'-P-CCNC: 14 U/L (ref 15–37)
BASOPHILS # BLD AUTO: 0.1 /CMM (ref 0–0.2)
BASOPHILS NFR BLD AUTO: 0.9 % (ref 0–2)
BILIRUB DIRECT SERPL-MCNC: 0 MG/DL (ref 0–0.2)
BILIRUB SERPL-MCNC: 0.1 MG/DL (ref 0.2–1)
BUN SERPL-MCNC: 16 MG/DL (ref 7–18)
CALCIUM SERPL-MCNC: 7.9 MG/DL (ref 8.5–10.1)
CHLORIDE SERPL-SCNC: 106 MMOL/L (ref 98–107)
CO2 SERPL-SCNC: 25 MMOL/L (ref 21–32)
CREAT SERPL-MCNC: 0.7 MG/DL (ref 0.6–1.3)
EOSINOPHIL NFR BLD AUTO: 2.8 % (ref 0–6)
GLUCOSE SERPL-MCNC: 97 MG/DL (ref 74–106)
HCT VFR BLD AUTO: 30 % (ref 33–45)
HGB BLD-MCNC: 9.8 G/DL (ref 11.5–14.8)
LIPASE SERPL-CCNC: 117 U/L (ref 73–393)
LYMPHOCYTES NFR BLD AUTO: 2.3 /CMM (ref 0.8–4.8)
LYMPHOCYTES NFR BLD AUTO: 28.9 % (ref 20–44)
MCHC RBC AUTO-ENTMCNC: 33 G/DL (ref 31–36)
MCV RBC AUTO: 80 FL (ref 82–100)
MONOCYTES NFR BLD AUTO: 0.7 /CMM (ref 0.1–1.3)
MONOCYTES NFR BLD AUTO: 8.4 % (ref 2–12)
NEUTROPHILS # BLD AUTO: 4.7 /CMM (ref 1.8–8.9)
NEUTROPHILS NFR BLD AUTO: 59 % (ref 43–81)
PH UR STRIP: 5 [PH] (ref 5–8)
PLATELET # BLD AUTO: 347 /CMM (ref 150–450)
POTASSIUM SERPL-SCNC: 3.4 MMOL/L (ref 3.5–5.1)
PROT SERPL-MCNC: 6.7 G/DL (ref 6.4–8.2)
PROT UR QL STRIP: 100 MG/DL
RBC # BLD AUTO: 3.76 MIL/UL (ref 4–5.2)
RBC #/AREA URNS HPF: (no result) /HPF (ref 0–2)
SODIUM SERPL-SCNC: 138 MMOL/L (ref 136–145)
UROBILINOGEN UR STRIP-MCNC: 0.2 EU/DL
WBC #/AREA URNS HPF: (no result) /HPF (ref 0–3)
WBC NRBC COR # BLD AUTO: 8 K/UL (ref 4.3–11)

## 2019-04-11 PROCEDURE — G0378 HOSPITAL OBSERVATION PER HR: HCPCS

## 2019-04-11 PROCEDURE — A9579 GAD-BASE MR CONTRAST NOS,1ML: HCPCS

## 2019-04-11 PROCEDURE — G0480 DRUG TEST DEF 1-7 CLASSES: HCPCS

## 2019-04-11 NOTE — NUR
RN NOTES

RECEIVED PT. SLEEPING , RESPONSIVE TO TACTILE STIMULI BUT EYES ARE CLOSE, SR ON TELE MONITOR 
HR-77, FAMILY AT BEDSIDE, SIDERAILS ARE ALL PADDED, SIDERAILSUPX2, CONTINUE TO MONITOR

## 2019-04-11 NOTE — NUR
BIB Boyfriend from Home "was standing on kitchen counter had a blank stare and 
she started shaking (seizure-like). Has had this before".  Placed on the 
monitor and hospital gown. Skin is warm and dry. Awaiting md for eval.

## 2019-04-11 NOTE — NUR
RN ADMISSION

PATIENT RECEIVED FROM [facility] DEPT, WITH IVF INFUSING. SON AND BOYFRIEND AT BEDSIDE. PATIENT 
TRANSFERRED TO A TELE BED, TELE MONITOR IN PLACED, AND PATIENT SHOWS SR 79. PATIENT 
SLEEPING, ONLY ABLE TO OPEN EYES FOR A FEW SECONDS, THEN WILL GO BACK TO SLEEP. RESPONSIVE 
TO VERBAL AND TACTILE STIMULI. PATIENT IS CURRENTLY ON O2 AT 2LPM VIA NC TO KEEP SPO2 > 92%. 
NO SOB NOTED AT THIS TIME. PATIENT IS MOVING RESTLESSLY IN BED AT TIMES. SEIZURE PRECAUTION 
OBSERVED. SIDERAILS PADDED. SKIN ASSESSMENT COMPLETED, SKIN DRY AND INTACT. BED ALARM ON, 
CALL LIGHT WITHIN REACH, WILL ENDORSE TO NIGHT SHIFT FOR CHELA.

## 2019-04-11 NOTE — NUR
CALLED HOUSE SUP DAISY FOR BED; STATED THAT HE IS WAITING FOR DISCHARGES FROM UP 
STAIRS TO HAPPEN, THEN HE WILL GIVE BEDS.

## 2019-04-11 NOTE — NUR
Full tonic-clonic seizure on arrival MD notified with orders for Ativan. Airway 
protected- HFNRM applied. Seizure pads in place

## 2019-04-11 NOTE — NUR
Patient is alert,lives locally with spouse/significant other. Prior to hospitalization she 
was ambulatory and independent with adl's. Has no DME or homehealth reported. She plan to 
return home once d/c, spouse will provide ride. 

-------------------------------------------------------------------------------

Addendum: 04/11/19 at 2332 by GAYLE FRANCO RN

-------------------------------------------------------------------------------

Amended: Links added.

## 2019-04-12 VITALS — DIASTOLIC BLOOD PRESSURE: 75 MMHG | SYSTOLIC BLOOD PRESSURE: 122 MMHG

## 2019-04-12 VITALS — DIASTOLIC BLOOD PRESSURE: 84 MMHG | SYSTOLIC BLOOD PRESSURE: 123 MMHG

## 2019-04-12 VITALS — SYSTOLIC BLOOD PRESSURE: 120 MMHG | DIASTOLIC BLOOD PRESSURE: 75 MMHG

## 2019-04-12 VITALS — SYSTOLIC BLOOD PRESSURE: 141 MMHG | DIASTOLIC BLOOD PRESSURE: 76 MMHG

## 2019-04-12 VITALS — SYSTOLIC BLOOD PRESSURE: 127 MMHG | DIASTOLIC BLOOD PRESSURE: 81 MMHG

## 2019-04-12 VITALS — SYSTOLIC BLOOD PRESSURE: 122 MMHG | DIASTOLIC BLOOD PRESSURE: 75 MMHG

## 2019-04-12 LAB
BASOPHILS # BLD AUTO: 0.1 /CMM (ref 0–0.2)
BASOPHILS NFR BLD AUTO: 0.7 % (ref 0–2)
BUN SERPL-MCNC: 9 MG/DL (ref 7–18)
CALCIUM SERPL-MCNC: 7.9 MG/DL (ref 8.5–10.1)
CHLORIDE SERPL-SCNC: 106 MMOL/L (ref 98–107)
CHOLEST SERPL-MCNC: 177 MG/DL (ref ?–200)
CO2 SERPL-SCNC: 25 MMOL/L (ref 21–32)
CREAT SERPL-MCNC: 0.6 MG/DL (ref 0.6–1.3)
EOSINOPHIL NFR BLD AUTO: 2.5 % (ref 0–6)
GLUCOSE SERPL-MCNC: 78 MG/DL (ref 74–106)
HCT VFR BLD AUTO: 28 % (ref 33–45)
HDLC SERPL-MCNC: 66 MG/DL (ref 40–60)
HGB BLD-MCNC: 9.1 G/DL (ref 11.5–14.8)
LDLC SERPL DIRECT ASSAY-MCNC: 103 MG/DL (ref 0–99)
LYMPHOCYTES NFR BLD AUTO: 1.8 /CMM (ref 0.8–4.8)
LYMPHOCYTES NFR BLD AUTO: 24.1 % (ref 20–44)
MAGNESIUM SERPL-MCNC: 2 MG/DL (ref 1.8–2.4)
MCHC RBC AUTO-ENTMCNC: 33 G/DL (ref 31–36)
MCV RBC AUTO: 80 FL (ref 82–100)
MONOCYTES NFR BLD AUTO: 0.7 /CMM (ref 0.1–1.3)
MONOCYTES NFR BLD AUTO: 9.2 % (ref 2–12)
NEUTROPHILS # BLD AUTO: 4.6 /CMM (ref 1.8–8.9)
NEUTROPHILS NFR BLD AUTO: 63.5 % (ref 43–81)
PHOSPHATE SERPL-MCNC: 2.7 MG/DL (ref 2.5–4.9)
PLATELET # BLD AUTO: 316 /CMM (ref 150–450)
POTASSIUM SERPL-SCNC: 3.6 MMOL/L (ref 3.5–5.1)
RBC # BLD AUTO: 3.45 MIL/UL (ref 4–5.2)
SODIUM SERPL-SCNC: 139 MMOL/L (ref 136–145)
TRIGL SERPL-MCNC: 85 MG/DL (ref 30–150)
WBC NRBC COR # BLD AUTO: 7.3 K/UL (ref 4.3–11)

## 2019-04-12 RX ADMIN — LEVETIRACETAM SCH MG: 250 TABLET, FILM COATED ORAL at 08:36

## 2019-04-12 RX ADMIN — ACETAMINOPHEN PRN MG: 325 TABLET ORAL at 02:41

## 2019-04-12 RX ADMIN — ACETAMINOPHEN PRN MG: 325 TABLET ORAL at 08:36

## 2019-04-12 RX ADMIN — LEVETIRACETAM SCH MG: 250 TABLET, FILM COATED ORAL at 21:25

## 2019-04-12 NOTE — NUR
RN NOTES

AWAKE, DENIES PAIN, NO SOB, ,MORNING CARE RENDERED, CALL LIGHT WITHIN REACH, SIDERAILSUPX2, 
PT. NEEDS ATTENDED

## 2019-04-12 NOTE — NUR
M/S RN NOTE



PATIENT SEEN BY DR. BOLTON WITH NEW ORDER FOR PSYCH CONSULT WITH DR. HUSTON. ORDER 
VERIFIED, ORDER NOTED AND CARRIED OUT. GPS FLOOR NOTIFIED

## 2019-04-12 NOTE — NUR
M/S RN CLOSING NOTES



PATIENT A/O X 4 AND ABLE TO MAKE NEEDS KNOWN, RESPONSIVE TO ALL STIMULI. RESPIRATORY EVEN 
AND NON LABORED WITH NO ACUTE RESPIRATORY DISTRESS, OXYGEN NOT NEEDED. ABDOMEN SOFT AND NON 
DISTENDED WITH ACTIVE BOWEL SOUNDS TO ALL QUADRANTS. PATIENT DENIES PAIN AND DISCOMFORT. 
SKIN WARM TO TOUCH, INTACT AND DRY. IV AT RIGHT ANTECUBITAL WITH GAUGE 18 PATENT IN 
FLUSHING, NO S/SX OF INFILTRATION. ALL CONCERNS ADDRESSED. PLACED CALL LIGHT WITHIN REACH TO 
ENSURE SAFETY.  ENDORSED PATIENT CONDITION TO NEXT SHIFT.

## 2019-04-12 NOTE — NUR
TELE RN OPENING NOTES



PATIENT A/O X 3 AND ABLE TO MAKE NEEDS KNOWN, RESPONSIVE TO ALL STIMULI. RESPIRATION EVEN 
AND NON LABORED WITH NO ACUTE RESPIRATORY DISTRESS. ABDOMEN SOFT AND NON DISTENDED WITH 
ACTIVE BOWEL SOUNDS TO ALL QUADRANTS. SKIN WARM TO TOUCH, INTACT AND DRY. IV LINE AT LEFT AC 
GAUGE 20 WITH NO S/SX OF INFILTRATION. TELE MONITOR WITH SINUS BRADYCARDIA 59. ALL CONCERNS 
ATTENDED. PLACED CALL LIGHT WITHIN REACH FOR SAFETY. WILL CONTINUE TO EVALUATE CARE.

## 2019-04-12 NOTE — NUR
RN NOTES



RECEIVED PATIENT AWAKE ALERT IN BED, ALL SAFETY MEASURES IN PLACE, DENIES ANY PAIN AND 
DISCOMFORT AT THIS TIME, NO SIGNS OF ACUTED RESPIRATORY DISTRESS NOTED, IV ACCESS ON HER 
RIGHT AC G#18  INTACT AND PATENT. WILL MONITOR ACCORDINGLY.

## 2019-04-12 NOTE — NUR
M/S RN NOTE



PATIENT STATED THAT SHE HIT HER HEAD 2 WEEKS AGO AT WORK IN A METAL REQUESTING FOR CT SCAN, 
NO CHANGE OF LEVEL OF CONSCIOUSNESS AFTER THE INCIDENT HAS BEEN NOTED. PAGED DR. BOLTON 
WITH NO RESPONSE NOTED AT THIS TIME, NEURO HAVEN'T MELVIN PATIENT YET. WILL CONTINUE TO 
MONITOR.

## 2019-04-13 VITALS — DIASTOLIC BLOOD PRESSURE: 70 MMHG | SYSTOLIC BLOOD PRESSURE: 120 MMHG

## 2019-04-13 VITALS — DIASTOLIC BLOOD PRESSURE: 70 MMHG | SYSTOLIC BLOOD PRESSURE: 118 MMHG

## 2019-04-13 VITALS — SYSTOLIC BLOOD PRESSURE: 126 MMHG | DIASTOLIC BLOOD PRESSURE: 72 MMHG

## 2019-04-13 LAB
BASOPHILS # BLD AUTO: 0.1 /CMM (ref 0–0.2)
BASOPHILS NFR BLD AUTO: 0.7 % (ref 0–2)
BUN SERPL-MCNC: 8 MG/DL (ref 7–18)
CALCIUM SERPL-MCNC: 8.2 MG/DL (ref 8.5–10.1)
CHLORIDE SERPL-SCNC: 106 MMOL/L (ref 98–107)
CO2 SERPL-SCNC: 26 MMOL/L (ref 21–32)
CREAT SERPL-MCNC: 0.6 MG/DL (ref 0.6–1.3)
EOSINOPHIL NFR BLD AUTO: 3.3 % (ref 0–6)
GLUCOSE SERPL-MCNC: 81 MG/DL (ref 74–106)
HCT VFR BLD AUTO: 30 % (ref 33–45)
HGB BLD-MCNC: 10.2 G/DL (ref 11.5–14.8)
LYMPHOCYTES NFR BLD AUTO: 1.3 /CMM (ref 0.8–4.8)
LYMPHOCYTES NFR BLD AUTO: 17.6 % (ref 20–44)
MCHC RBC AUTO-ENTMCNC: 34 G/DL (ref 31–36)
MCV RBC AUTO: 79 FL (ref 82–100)
MONOCYTES NFR BLD AUTO: 0.5 /CMM (ref 0.1–1.3)
MONOCYTES NFR BLD AUTO: 7.1 % (ref 2–12)
NEUTROPHILS # BLD AUTO: 5.1 /CMM (ref 1.8–8.9)
NEUTROPHILS NFR BLD AUTO: 71.3 % (ref 43–81)
PLATELET # BLD AUTO: 344 /CMM (ref 150–450)
POTASSIUM SERPL-SCNC: 4 MMOL/L (ref 3.5–5.1)
RBC # BLD AUTO: 3.81 MIL/UL (ref 4–5.2)
SODIUM SERPL-SCNC: 140 MMOL/L (ref 136–145)
WBC NRBC COR # BLD AUTO: 7.2 K/UL (ref 4.3–11)

## 2019-04-13 RX ADMIN — LEVETIRACETAM SCH MG: 250 TABLET, FILM COATED ORAL at 09:03

## 2019-04-13 NOTE — NUR
mri rsults in and ralayed to thpovider. patient discharged in stable condition and no 
seizures noted. iv dcd intact. top dressing aaplied.

## 2019-04-13 NOTE — NUR
RN NOTES



ALL NEEDS ATTENDED AND MET, ABLE TO REST AND SLEEP WITH LONG INTERVALS. WILL ENDORS TO AM 
NURSE FOR CONTINUITY OF CARE.

## 2019-04-13 NOTE — NUR
Patient discharge home in stable condition ambulatory/ request and accompanied by rn.. home 
in improved condition.

## 2024-10-18 NOTE — NUR
----- Message from [employer] sent at 10/18/2024  1:28 PM CDT -----  Name of Who is Calling: RADHA ELENA JR. [[id number]]          What is the request in detail: Pt is requesting a call back to get an appointment scheduled on 10/25. Pt advised that Dr Chou stated if needed he can ask the staff for an override to be seen on 10/25 instead of 10/29. Please assist.           Can the clinic reply by [facility]: No          [facility] to [facility] Back [facility]  [phone]   WOUND CARE CONSULT: PT PRESENTS WITH INTACT SKIN AND IS  CONTINENT AT THIS TIME AS WELL AS 
INDEPENDENT WITH BED MOBILITY. WILL SEE PRN.